# Patient Record
Sex: FEMALE | Race: WHITE | Employment: UNEMPLOYED | URBAN - METROPOLITAN AREA
[De-identification: names, ages, dates, MRNs, and addresses within clinical notes are randomized per-mention and may not be internally consistent; named-entity substitution may affect disease eponyms.]

---

## 2019-06-27 ENCOUNTER — DOCUMENTATION ONLY (OUTPATIENT)
Dept: ORTHOPEDIC SURGERY | Facility: CLINIC | Age: 69
End: 2019-06-27

## 2019-06-27 NOTE — PROGRESS NOTES
Attempt to contact patient to inform Provider Ramona Mchugh request to follow up in 6 months and cancel appt for tomorrow. No answer. LVM .  Martina Duong LPN

## 2022-03-06 ENCOUNTER — HOSPITAL ENCOUNTER (OUTPATIENT)
Dept: LAB | Age: 72
Discharge: HOME | End: 2022-03-06
Payer: MEDICARE

## 2022-03-06 LAB — CREAT SERPL-MCNC: 0.8 MG/DL (ref 0.7–1.2)

## 2022-03-06 PROCEDURE — 36415 COLL VENOUS BLD VENIPUNCTURE: CPT

## 2022-03-06 PROCEDURE — 82565 ASSAY OF CREATININE: CPT

## 2023-02-14 ENCOUNTER — TELEPHONE (OUTPATIENT)
Age: 73
End: 2023-02-14

## 2023-02-14 NOTE — TELEPHONE ENCOUNTER
Patient had TKA last year and is scheduled for dental appt next week. Needs antibiotic sent to [facility] in [facility].